# Patient Record
Sex: MALE | Race: BLACK OR AFRICAN AMERICAN | Employment: FULL TIME | URBAN - METROPOLITAN AREA
[De-identification: names, ages, dates, MRNs, and addresses within clinical notes are randomized per-mention and may not be internally consistent; named-entity substitution may affect disease eponyms.]

---

## 2023-01-26 ENCOUNTER — OFFICE VISIT (OUTPATIENT)
Dept: NEUROLOGY | Age: 58
End: 2023-01-26
Payer: COMMERCIAL

## 2023-01-26 VITALS
RESPIRATION RATE: 16 BRPM | HEIGHT: 72 IN | SYSTOLIC BLOOD PRESSURE: 150 MMHG | OXYGEN SATURATION: 97 % | HEART RATE: 82 BPM | DIASTOLIC BLOOD PRESSURE: 84 MMHG | BODY MASS INDEX: 36.16 KG/M2 | WEIGHT: 267 LBS

## 2023-01-26 DIAGNOSIS — R56.9 SEIZURE-LIKE ACTIVITY (HCC): ICD-10-CM

## 2023-01-26 DIAGNOSIS — R20.8 ELECTRICAL SHOCK SENSATION: Primary | ICD-10-CM

## 2023-01-26 PROCEDURE — 95816 EEG AWAKE AND DROWSY: CPT | Performed by: PSYCHIATRY & NEUROLOGY

## 2023-01-26 PROCEDURE — 99204 OFFICE O/P NEW MOD 45 MIN: CPT | Performed by: PSYCHIATRY & NEUROLOGY

## 2023-01-26 RX ORDER — AMLODIPINE AND BENAZEPRIL HYDROCHLORIDE 5; 20 MG/1; MG/1
CAPSULE ORAL
COMMUNITY
Start: 2023-01-11

## 2023-01-26 RX ORDER — HYDROCHLOROTHIAZIDE 25 MG/1
TABLET ORAL
COMMUNITY
Start: 2022-12-06

## 2023-01-26 NOTE — PROGRESS NOTES
Chief Complaint   Patient presents with    New Patient     Feeling in head: Started years ago. Last time he had it was 4 months ago. Has had 6-7 episodes in total. Lasting 2-3 minutes. HISTORY OF PRESENT ILLNESS  Nory Lan is a 56 y.o. male who came in for an evaluation of a electrical type sensation that he experiences in his hand from time to time. It occurs a few times a year and the last time it happened was 4 or 5 months ago. He will start to feel shocking sensations inside her head and he has to stop doing what he is doing until it passes, which usually is about 1 to 2 minutes. On a few occasions he has felt tired and sleepy afterwards. His friend reports that he will appear to just stare and she could feel some quivering in her upper body during. He never loses awareness or consciousness. There has never been any convulsions, tongue bite or bladder incontinence. Most of these events occurred during the day but on a rare occasion he has felt it as he was trying to fall asleep. There is no associated headache or any other symptoms. This has been going on since age 25 when he was in the [employer]. He had a frostbite and was given amitriptyline for pain control and that is when he started noticing it. He came off of the medication but symptoms continued. Past Medical History:   Diagnosis Date    Essential hypertension     PAD (peripheral artery disease) ([facility])      Current Outpatient Medications   Medication Sig    amLODIPine-benazepril (LOTREL) 5-20 mg per capsule TAKE 1 CAPSULE BY MOUTH EVERY DAY FOR 90 DAYS    hydroCHLOROthiazide (HYDRODIURIL) 25 mg tablet TAKE 1 TABLET BY MOUTH EVERY DAY IN THE MORNING FOR 30 DAYS    IBUPROFEN PO Take  by mouth as needed. No current facility-administered medications for this visit. No Known Allergies  History reviewed. No pertinent family history.   Social History     Tobacco Use    Smoking status: Never     Passive exposure: Never Smokeless tobacco: Never   Vaping Use    Vaping Use: Never used   Substance Use Topics    Alcohol use: Yes     Comment: occasionally     History reviewed. No pertinent surgical history. REVIEW OF SYSTEMS  Review of Systems - History obtained from the patient  Psychological ROS: negative  ENT ROS: negative  Hematological and Lymphatic ROS: negative  Endocrine ROS: negative  Respiratory ROS: no cough, shortness of breath, or wheezing  Cardiovascular ROS: no chest pain or dyspnea on exertion  Gastrointestinal ROS: no abdominal pain, change in bowel habits, or black or bloody stools  Genito-Urinary ROS: no dysuria, trouble voiding, or hematuria  Musculoskeletal ROS: negative  Dermatological ROS: negative      PHYSICAL EXAMINATION:    Visit Vitals  BP (!) 150/84 (BP 1 Location: Left upper arm, BP Patient Position: Sitting)   Pulse 82   Resp 16   Ht 6' (1.829 m)   Wt 267 lb (121.1 kg)   SpO2 97%   BMI 36.21 kg/m²     General:  Well groomed individual in no acute distress. Neck: Supple, nontender, no bruits, no pain with resistance to active range of motion. Heart: Regular rate and rhythm. Normal S1S2. Lungs:  Equal chest expansion, no cough, no wheeze  Musculoskeletal:  Extremities revealed no edema and had full range of motion of joints. Psych:  Good mood and bright affect    NEUROLOGICAL EXAMINATION:     Mental Status:   Alert and oriented to person, place, and time with recent and remote memory intact. Attention span and concentration are normal. Speech is fluent. Cranial Nerves:    II, III, IV, VI:  Visual acuity grossly intact. Visual fields are normal.    Pupils are equal, round, and reactive to light. Extra-ocular movements are full and fluid. Fundoscopic exam was benign, no ptosis or nystagmus. V-XII: Hearing is grossly intact. Facial features are symmetric, with normal sensation and strength. The palate rises symmetrically and the tongue protrudes midline. Sternocleidomastoids 5/5. Motor Examination: Normal tone, bulk, and strength. 5/5 muscle strength throughout. No cogwheel rigidity or clonus present. Sensory exam:  Normal throughout to pinprick, temperature, and vibration sense. Normal proprioception. Coordination:  Finger to nose and rapid arm movement testing was normal.   No resting or intention tremor    Gait and Station:  Steady while walking on toes, heels, and with tandem walking. Normal arm swing. No Rhomberg or pronator drift. No muscle wasting or fasiculations noted. Reflexes:  DTRs 2+ throughout. Toes downgoing. LABS / IMAGING  No recent imaging/labs on file    ASSESSMENT    ICD-10-CM ICD-9-CM    1. Electrical shock sensation  R20.8 782.0 MRI BRAIN WO CONT      EEG AMB NEURO      2. Seizure-like activity (HCC)  R56.9 780.39 MRI BRAIN WO CONT      EEG AMB NEURO          DISCUSSION  Mr. Jamal Bright has been experiencing episodic electrical/shocklike sensations in his head lasting 1 to 2 minutes, occasionally followed by periods of fatigue/exhaustion. There has never been any loss of consciousness or witnessed convulsion. His symptoms are somewhat atypical but reasonable to evaluate him from a seizure standpoint. I have ordered MRI scan of the brain and prolonged ambulatory EEG to rule out any electrocerebral disturbance. If these tests are negative and events continue, we can empirically try an anticonvulsant medication and see how he does.     Aniya Rae MD  Diplomate, American Board of Psychiatry & Neurology (Neurology)  [employer] Board of Psychiatry & Neurology (Clinical Neurophysiology)  Diplomate, American Board of Electrodiagnostic Medicine

## 2023-01-26 NOTE — PROGRESS NOTES
Chief Complaint   Patient presents with    New Patient     Feeling in head: Started years ago. Last time he had it was 4 months ago. Has had 6-7 episodes in total. Lasting 2-3 minutes.       Visit Vitals  BP (!) 150/84 (BP 1 Location: Left upper arm, BP Patient Position: Sitting)   Pulse 82   Resp 16   Ht 6' (1.829 m)   Wt 267 lb (121.1 kg)   SpO2 97%   BMI 36.21 kg/m²

## 2025-08-20 ENCOUNTER — OFFICE VISIT (OUTPATIENT)
Facility: CLINIC | Age: 60
End: 2025-08-20
Payer: COMMERCIAL

## 2025-08-20 VITALS
DIASTOLIC BLOOD PRESSURE: 98 MMHG | OXYGEN SATURATION: 96 % | SYSTOLIC BLOOD PRESSURE: 138 MMHG | HEART RATE: 78 BPM | TEMPERATURE: 97.3 F | HEIGHT: 72 IN | WEIGHT: 265.1 LBS | BODY MASS INDEX: 35.91 KG/M2 | RESPIRATION RATE: 18 BRPM

## 2025-08-20 DIAGNOSIS — Z78.9 CPAP VENTILATION TREATMENT NOT TOLERATED: ICD-10-CM

## 2025-08-20 DIAGNOSIS — G47.33 OSA (OBSTRUCTIVE SLEEP APNEA): ICD-10-CM

## 2025-08-20 DIAGNOSIS — R73.03 PREDIABETES: ICD-10-CM

## 2025-08-20 DIAGNOSIS — E78.5 HYPERLIPIDEMIA, UNSPECIFIED HYPERLIPIDEMIA TYPE: ICD-10-CM

## 2025-08-20 DIAGNOSIS — E66.9 OBESITY (BMI 35.0-39.9 WITHOUT COMORBIDITY): ICD-10-CM

## 2025-08-20 DIAGNOSIS — Z12.5 PROSTATE CANCER SCREENING: ICD-10-CM

## 2025-08-20 DIAGNOSIS — Z76.89 ESTABLISHING CARE WITH NEW DOCTOR, ENCOUNTER FOR: Primary | ICD-10-CM

## 2025-08-20 DIAGNOSIS — Z76.89 ESTABLISHING CARE WITH NEW DOCTOR, ENCOUNTER FOR: ICD-10-CM

## 2025-08-20 DIAGNOSIS — I10 PRIMARY HYPERTENSION: ICD-10-CM

## 2025-08-20 PROCEDURE — 3080F DIAST BP >= 90 MM HG: CPT | Performed by: INTERNAL MEDICINE

## 2025-08-20 PROCEDURE — 3075F SYST BP GE 130 - 139MM HG: CPT | Performed by: INTERNAL MEDICINE

## 2025-08-20 PROCEDURE — 99203 OFFICE O/P NEW LOW 30 MIN: CPT | Performed by: INTERNAL MEDICINE

## 2025-08-20 RX ORDER — LIDOCAINE 50 MG/G
OINTMENT TOPICAL DAILY PRN
COMMUNITY
Start: 2025-05-12

## 2025-08-20 RX ORDER — METFORMIN HYDROCHLORIDE 500 MG/1
500 TABLET, EXTENDED RELEASE ORAL 2 TIMES DAILY
COMMUNITY
Start: 2025-03-28

## 2025-08-20 RX ORDER — ROSUVASTATIN CALCIUM 20 MG/1
20 TABLET, COATED ORAL DAILY
COMMUNITY
Start: 2025-03-28

## 2025-08-20 RX ORDER — CHOLECALCIFEROL (VITAMIN D3) 50 MCG
100 TABLET ORAL DAILY PRN
COMMUNITY
Start: 2025-03-25

## 2025-08-20 RX ORDER — TRAZODONE HYDROCHLORIDE 50 MG/1
50 TABLET ORAL NIGHTLY
COMMUNITY
Start: 2025-03-25

## 2025-08-20 SDOH — ECONOMIC STABILITY: FOOD INSECURITY: WITHIN THE PAST 12 MONTHS, YOU WORRIED THAT YOUR FOOD WOULD RUN OUT BEFORE YOU GOT MONEY TO BUY MORE.: NEVER TRUE

## 2025-08-20 SDOH — ECONOMIC STABILITY: FOOD INSECURITY: WITHIN THE PAST 12 MONTHS, THE FOOD YOU BOUGHT JUST DIDN'T LAST AND YOU DIDN'T HAVE MONEY TO GET MORE.: NEVER TRUE

## 2025-08-20 ASSESSMENT — PATIENT HEALTH QUESTIONNAIRE - PHQ9
SUM OF ALL RESPONSES TO PHQ QUESTIONS 1-9: 0
1. LITTLE INTEREST OR PLEASURE IN DOING THINGS: NOT AT ALL
SUM OF ALL RESPONSES TO PHQ QUESTIONS 1-9: 0
2. FEELING DOWN, DEPRESSED OR HOPELESS: NOT AT ALL

## 2025-08-21 PROBLEM — Z78.9 CPAP VENTILATION TREATMENT NOT TOLERATED: Status: ACTIVE | Noted: 2025-08-21

## 2025-08-21 LAB
ALBUMIN SERPL-MCNC: 4.1 G/DL (ref 3.5–5.2)
ALBUMIN/GLOB SERPL: 1.2 (ref 1.1–2.2)
ALP SERPL-CCNC: 82 U/L (ref 40–129)
ALT SERPL-CCNC: 25 U/L (ref 10–50)
ANION GAP SERPL CALC-SCNC: 9 MMOL/L (ref 2–14)
AST SERPL-CCNC: 22 U/L (ref 10–50)
BASOPHILS # BLD: 0.04 K/UL (ref 0–0.1)
BASOPHILS NFR BLD: 0.7 % (ref 0–1)
BILIRUB SERPL-MCNC: 1 MG/DL (ref 0–1.2)
BUN SERPL-MCNC: 18 MG/DL (ref 6–20)
BUN/CREAT SERPL: 15 (ref 12–20)
CALCIUM SERPL-MCNC: 10.2 MG/DL (ref 8.6–10)
CHLORIDE SERPL-SCNC: 105 MMOL/L (ref 98–107)
CHOLEST SERPL-MCNC: 229 MG/DL (ref 0–200)
CO2 SERPL-SCNC: 28 MMOL/L (ref 20–29)
CREAT SERPL-MCNC: 1.23 MG/DL (ref 0.7–1.2)
DIFFERENTIAL METHOD BLD: ABNORMAL
EOSINOPHIL # BLD: 0.14 K/UL (ref 0–0.4)
EOSINOPHIL NFR BLD: 2.4 % (ref 0–7)
ERYTHROCYTE [DISTWIDTH] IN BLOOD BY AUTOMATED COUNT: 13.3 % (ref 11.5–14.5)
EST. AVERAGE GLUCOSE BLD GHB EST-MCNC: 116 MG/DL
GLOBULIN SER CALC-MCNC: 3.3 G/DL (ref 2–4)
GLUCOSE SERPL-MCNC: 86 MG/DL (ref 65–100)
HBA1C MFR BLD: 5.7 % (ref 4–5.6)
HCT VFR BLD AUTO: 48.7 % (ref 36.6–50.3)
HDLC SERPL-MCNC: 52 MG/DL (ref 40–60)
HDLC SERPL: 4.4 (ref 0–5)
HGB BLD-MCNC: 15.8 G/DL (ref 12.1–17)
IMM GRANULOCYTES # BLD AUTO: 0.01 K/UL (ref 0–0.04)
IMM GRANULOCYTES NFR BLD AUTO: 0.2 % (ref 0–0.5)
LDLC SERPL CALC-MCNC: 160 MG/DL
LYMPHOCYTES # BLD: 2.75 K/UL (ref 0.8–3.5)
LYMPHOCYTES NFR BLD: 47.2 % (ref 12–49)
MCH RBC QN AUTO: 27.5 PG (ref 26–34)
MCHC RBC AUTO-ENTMCNC: 32.4 G/DL (ref 30–36.5)
MCV RBC AUTO: 84.8 FL (ref 80–99)
MONOCYTES # BLD: 0.68 K/UL (ref 0–1)
MONOCYTES NFR BLD: 11.7 % (ref 5–13)
NEUTS SEG # BLD: 2.21 K/UL (ref 1.8–8)
NEUTS SEG NFR BLD: 37.8 % (ref 32–75)
NRBC # BLD: 0 K/UL (ref 0–0.01)
NRBC BLD-RTO: 0 PER 100 WBC
PLATELET # BLD AUTO: 243 K/UL (ref 150–400)
PMV BLD AUTO: 10.5 FL (ref 8.9–12.9)
POTASSIUM SERPL-SCNC: 5.4 MMOL/L (ref 3.5–5.1)
PROT SERPL-MCNC: 7.5 G/DL (ref 6.4–8.3)
PSA SERPL-MCNC: 1.2 NG/ML (ref 0–3.1)
RBC # BLD AUTO: 5.74 M/UL (ref 4.1–5.7)
SODIUM SERPL-SCNC: 142 MMOL/L (ref 136–145)
TRIGL SERPL-MCNC: 89 MG/DL (ref 0–150)
VLDLC SERPL CALC-MCNC: 18 MG/DL
WBC # BLD AUTO: 5.8 K/UL (ref 4.1–11.1)

## 2025-08-26 ENCOUNTER — RESULTS FOLLOW-UP (OUTPATIENT)
Facility: CLINIC | Age: 60
End: 2025-08-26